# Patient Record
Sex: MALE | Race: WHITE | Employment: FULL TIME | URBAN - METROPOLITAN AREA
[De-identification: names, ages, dates, MRNs, and addresses within clinical notes are randomized per-mention and may not be internally consistent; named-entity substitution may affect disease eponyms.]

---
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
## 2020-01-03 ENCOUNTER — OFFICE VISIT (OUTPATIENT)
Dept: INTERNAL MEDICINE CLINIC | Facility: CLINIC | Age: 20
End: 2020-01-03
Payer: COMMERCIAL

## 2020-01-03 VITALS
HEART RATE: 91 BPM | BODY MASS INDEX: 21.7 KG/M2 | HEIGHT: 69 IN | DIASTOLIC BLOOD PRESSURE: 67 MMHG | SYSTOLIC BLOOD PRESSURE: 121 MMHG | WEIGHT: 146.5 LBS

## 2020-01-03 DIAGNOSIS — R19.09 LEFT GROIN MASS: Primary | ICD-10-CM

## 2020-01-03 PROCEDURE — 99202 OFFICE O/P NEW SF 15 MIN: CPT | Performed by: INTERNAL MEDICINE

## 2020-01-03 NOTE — PROGRESS NOTES
Mary Ortiz is a 20year old male. Patient presents with:  Lump: groin area    HPI:   About 2 days ago, he noticed a lump in his left groin while showering. He wanted to come in to have the lump checked. No associated symptoms. No pain.   No tendernes MD  1/3/2020  11:23 AM

## 2020-01-03 NOTE — PATIENT INSTRUCTIONS
The small lump in your left groin is likely a sebaceous cyst.  Observe closely and return if size significantly increases or symptoms develop.

## 2021-08-27 NOTE — ED PROVIDER NOTES
Medication:   Requested Prescriptions     Pending Prescriptions Disp Refills    fluticasone (FLONASE) 50 MCG/ACT nasal spray [Pharmacy Med Name: FLUTICASONE PROP 50 MCG SPRAY] 1 Bottle      Sig: SPRAY TWO SPRAYS IN EACH NOSTRIL TWICE DAILY     Last Filled: 7.28.21    Last appt: 7/29/2021   Next appt: Visit date not found    Last OARRS:   RX Monitoring 7/29/2021   Attestation -   Periodic Controlled Substance Monitoring Possible medication side effects, risk of tolerance/dependence & alternative treatments discussed. ;No signs of potential drug abuse or diversion identified. ;Assessed functional status. Patient presents with:  Sore Throat  Fever (infectious)      HPI:     Chasidy Lee is a 22year old malesignificant past medical history presents with a sore throat which started last night. Patient reports pain as well.   Denies any neck pain or neck st significant bilateral tonsillar hypertrophy. Will give Decadron prior to discharge. Patient also has exudate noted to bilateral tonsils. Rapid strep reviewed and is negative. Will send for culture.   Patient instructed in supportive care and close follo

## (undated) NOTE — Clinical Note
VACCINE ADMINISTRATION RECORD  PARENT / GUARDIAN APPROVAL  Date: 2017  Vaccine administered to:  Bethany Rmoan     : 2000    MRN: [id number]    A copy of the appropriate Centers for Disease Control and Prevention Vaccine Information statement

## (undated) NOTE — LETTER
6/28/2017              Bibi Mendosa 5/24/2000          [address]      Immunization History   Administered Date(s) Administered   • DTAP 07/21/1999, 09/14/1999, 12/01/1999, 04/29/2000, 06/02/2004   • HEP A 08/15/2011

## (undated) NOTE — Clinical Note
State of [facility] [employer] of Child Health Examination       Student's Name  Krista Toledo Birth Nikos Title                           Date    (If adding dates to the above immunization history section, put your initials by date(s) and sign here.)   ALTERNATIVE PROOF OF IMMUNITY   1 Diagnosis of asthma? Child wakes during the night coughing   Yes   No    Yes   No    Loss of function of one of paired organs? (eye/ear/kidney/testicle)   Yes   No      Birth Defects? Developmental delay? Yes   No    Yes   No  Hospitalizations? When? Signs of Insulin Resistance (hypertension, dyslipidemia, polycystic ovarian syndrome, acanthosis nigricans)        No                   At Risk  No   Lead Risk Questionnaire  Req'd for children 6 months thru 6 yrs enrolled in licensed or public elieser Needs/Restrictions     None   SPECIAL INSTRUCTIONS/DEVICES e.g. safety glasses, glass eye, chest protector for arrhythmia, pacemaker, prosthetic device, dental bridge, false teeth, athleticsupport/cup     None   MENTAL HEALTH/OTHER   Is there anything else

## (undated) NOTE — MR AVS SNAPSHOT
[address] [facility] [address] [phone]               Thank you for choosing us for your health care visit with Chelsea Alvarenga MD.  We are glad to serve you and happy to provide you with this summa Immunization Record:    Immunization History  Administered            Date(s) Administered    DTAP                  07/21/1999 09/14/1999 12/01/1999 04/29/2000 06/02/2004      HEP A                 08/15/2011  05/30/2013 Gets interested in serious relationships. Can combine both emotional and physical intimacy in a relationship. Has developed a clear sexual identity. Mental Development   Is able to think ideas through and set goals. Is able to express ideas.    Audrey mitchell information, go to [email or web address] and click on the Sign Up Now link in the [employer] box. Enter your [employer] Activation Code exactly as it appears below along with your Zip Code and Date of Birth to complete the sign-up process.  If you do

## (undated) NOTE — Clinical Note
5/31/2017              [address]        [facility]         Immunization History   Administered Date(s) Administered   • DTAP 07/21/1999, 09/14/1999, 12/01/1999, 04/29/2000, 06/02/2004   • HEP A 08/15/2011, 05/30/

## (undated) NOTE — Clinical Note
5/31/2017              [address]        [facility]         Immunization History   Administered Date(s) Administered   • DTAP 07/21/1999, 09/14/1999, 12/01/1999, 04/29/2000, 06/02/2004   • HEP A 08/15/2011, 05/30/

## (undated) NOTE — LETTER
VACCINE ADMINISTRATION RECORD  PARENT / GUARDIAN APPROVAL  Date: 2017  Vaccine administered to:  Ottoniel Jose     : 2000    MRN: [id number]    A copy of the appropriate Centers for Disease Control and Prevention Vaccine Information statement